# Patient Record
Sex: FEMALE | Race: WHITE | NOT HISPANIC OR LATINO | Employment: OTHER | URBAN - METROPOLITAN AREA
[De-identification: names, ages, dates, MRNs, and addresses within clinical notes are randomized per-mention and may not be internally consistent; named-entity substitution may affect disease eponyms.]

---

## 2018-05-01 ENCOUNTER — NEW PATIENT COMPREHENSIVE (OUTPATIENT)
Dept: URBAN - METROPOLITAN AREA CLINIC 39 | Facility: CLINIC | Age: 68
End: 2018-05-01

## 2018-05-01 DIAGNOSIS — H40.033: ICD-10-CM

## 2018-05-01 DIAGNOSIS — E11.9: ICD-10-CM

## 2018-05-01 PROCEDURE — 3072F LOW RISK FOR RETINOPATHY: CPT

## 2018-05-01 PROCEDURE — 92015 DETERMINE REFRACTIVE STATE: CPT

## 2018-05-01 PROCEDURE — 92020 GONIOSCOPY: CPT

## 2018-05-01 PROCEDURE — 2022F DILAT RTA XM EVC RTNOPTHY: CPT

## 2018-05-01 PROCEDURE — G8427 DOCREV CUR MEDS BY ELIG CLIN: HCPCS

## 2018-05-01 PROCEDURE — G8785 BP SCRN NO PERF AT INTERVAL: HCPCS

## 2018-05-01 PROCEDURE — 1036F TOBACCO NON-USER: CPT

## 2018-05-01 PROCEDURE — 99203 OFFICE O/P NEW LOW 30 MIN: CPT

## 2018-05-01 ASSESSMENT — VISUAL ACUITY
OD_SC: J3
OS_CC: J1+
OD_CC: J1+
OD_SC: 20/30-1
OS_SC: J3
OS_SC: 20/50

## 2018-05-01 ASSESSMENT — TONOMETRY
OS_IOP_MMHG: 12
OD_IOP_MMHG: 18

## 2018-06-25 ENCOUNTER — SURGERY/PROCEDURE (OUTPATIENT)
Dept: URBAN - METROPOLITAN AREA SURGERY 14 | Facility: SURGERY | Age: 68
End: 2018-06-25

## 2018-06-25 ENCOUNTER — CONSULT (OUTPATIENT)
Dept: URBAN - METROPOLITAN AREA SURGERY 14 | Facility: SURGERY | Age: 68
End: 2018-06-25

## 2018-06-25 VITALS
SYSTOLIC BLOOD PRESSURE: 157 MMHG | RESPIRATION RATE: 13 BRPM | HEIGHT: 55 IN | DIASTOLIC BLOOD PRESSURE: 70 MMHG | HEART RATE: 60 BPM

## 2018-06-25 DIAGNOSIS — E11.9: ICD-10-CM

## 2018-06-25 DIAGNOSIS — H40.031: ICD-10-CM

## 2018-06-25 DIAGNOSIS — H40.033: ICD-10-CM

## 2018-06-25 PROCEDURE — 92014 COMPRE OPH EXAM EST PT 1/>: CPT

## 2018-06-25 PROCEDURE — 92132 CPTRZD OPH DX IMG ANT SGM: CPT

## 2018-06-25 PROCEDURE — G8428 CUR MEDS NOT DOCUMENT: HCPCS

## 2018-06-25 PROCEDURE — 92020 GONIOSCOPY: CPT

## 2018-06-25 PROCEDURE — 76514 ECHO EXAM OF EYE THICKNESS: CPT

## 2018-06-25 PROCEDURE — G8952 PRE-HTN/HTN, NO F/U, NOT GVN: HCPCS

## 2018-06-25 PROCEDURE — 66761 REVISION OF IRIS: CPT

## 2018-06-25 PROCEDURE — 1036F TOBACCO NON-USER: CPT

## 2018-06-25 PROCEDURE — G8418 CALC BMI BLW LOW PARAM F/U: HCPCS

## 2018-06-25 RX ORDER — PREDNISOLONE ACETATE 10 MG/ML
1 SUSPENSION/ DROPS OPHTHALMIC
Start: 2018-06-25

## 2018-06-25 ASSESSMENT — VISUAL ACUITY
OS_CC: J1
OD_SC: 20/25-2
OS_SC: 20/50-2
OD_CC: J1

## 2018-06-25 ASSESSMENT — PACHYMETRY
OS_CT_UM: 654
OD_CT_UM: 643

## 2018-06-25 ASSESSMENT — TONOMETRY
OD_IOP_MMHG: 17
OS_IOP_MMHG: 17

## 2018-06-27 ENCOUNTER — SURGERY/PROCEDURE (OUTPATIENT)
Dept: URBAN - METROPOLITAN AREA SURGERY 14 | Facility: SURGERY | Age: 68
End: 2018-06-27

## 2018-06-27 DIAGNOSIS — H40.032: ICD-10-CM

## 2018-06-27 PROCEDURE — 66761 REVISION OF IRIS: CPT

## 2018-07-16 ENCOUNTER — DILATED FUNDUS EXAM (OUTPATIENT)
Dept: URBAN - METROPOLITAN AREA CLINIC 39 | Facility: CLINIC | Age: 68
End: 2018-07-16

## 2018-07-16 DIAGNOSIS — H40.013: ICD-10-CM

## 2018-07-16 DIAGNOSIS — H40.033: ICD-10-CM

## 2018-07-16 PROCEDURE — 9222550 BILAT EXTENDED OPHTHALMOSCOPY, FIRST

## 2018-07-16 PROCEDURE — 92250 FUNDUS PHOTOGRAPHY W/I&R: CPT

## 2018-07-16 PROCEDURE — G8427 DOCREV CUR MEDS BY ELIG CLIN: HCPCS

## 2018-07-16 PROCEDURE — 92132 CPTRZD OPH DX IMG ANT SGM: CPT

## 2018-07-16 PROCEDURE — 92012 INTRM OPH EXAM EST PATIENT: CPT

## 2018-07-16 PROCEDURE — G8785 BP SCRN NO PERF AT INTERVAL: HCPCS

## 2018-07-16 PROCEDURE — 1036F TOBACCO NON-USER: CPT

## 2018-07-16 PROCEDURE — 92020 GONIOSCOPY: CPT

## 2018-07-16 ASSESSMENT — TONOMETRY
OD_IOP_MMHG: 16
OS_IOP_MMHG: 15

## 2018-07-16 ASSESSMENT — VISUAL ACUITY
OD_SC: 20/25-1
OS_SC: 20/40-2
OS_PH: 20/30+1

## 2019-08-21 NOTE — PATIENT DISCUSSION
[facility]: OCT COMPLETED TODAY. MILD FOVEAL THINNING. WILL REPEAT IN 6 MONTHS AND HAVE A DILATED FUNDUS EXAMINATION. 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 ORDER VISUAL FIELD 10-2. FOLLOW-UP AS DIRECTED.

## 2019-08-21 NOTE — PATIENT DISCUSSION
Explained to patient the refraction, and that she may need a pair of glasses.  Patient still denied refraction, and she said her eyes have never been this blurry before. -Clifford Pittman

## 2020-02-25 ENCOUNTER — ESTABLISHED COMPREHENSIVE EXAM (OUTPATIENT)
Dept: URBAN - METROPOLITAN AREA CLINIC 38 | Facility: CLINIC | Age: 70
End: 2020-02-25

## 2020-02-25 DIAGNOSIS — H52.02: ICD-10-CM

## 2020-02-25 DIAGNOSIS — E11.9: ICD-10-CM

## 2020-02-25 DIAGNOSIS — H52.203: ICD-10-CM

## 2020-02-25 DIAGNOSIS — H40.033: ICD-10-CM

## 2020-02-25 DIAGNOSIS — H40.013: ICD-10-CM

## 2020-02-25 DIAGNOSIS — H25.813: ICD-10-CM

## 2020-02-25 DIAGNOSIS — H52.4: ICD-10-CM

## 2020-02-25 PROCEDURE — 92014 COMPRE OPH EXAM EST PT 1/>: CPT

## 2020-02-25 PROCEDURE — 92015 DETERMINE REFRACTIVE STATE: CPT

## 2020-02-25 ASSESSMENT — TONOMETRY
OS_IOP_MMHG: 18
OS_IOP_MMHG: 20
OD_IOP_MMHG: 22
OD_IOP_MMHG: 20

## 2020-02-25 ASSESSMENT — VISUAL ACUITY
OD_CC: 20/20-2
OS_CC: 20/40-2
OU_SC: J3-
OD_SC: J4
OS_SC: J4
OU_CC: 20/30

## 2020-08-25 ENCOUNTER — IOP CHECK (OUTPATIENT)
Dept: URBAN - METROPOLITAN AREA CLINIC 38 | Facility: CLINIC | Age: 70
End: 2020-08-25

## 2020-08-25 DIAGNOSIS — H40.013: ICD-10-CM

## 2020-08-25 PROCEDURE — 92133 CPTRZD OPH DX IMG PST SGM ON: CPT

## 2020-08-25 PROCEDURE — 99213 OFFICE O/P EST LOW 20 MIN: CPT

## 2020-08-25 ASSESSMENT — VISUAL ACUITY
OU_SC: 20/20
OS_PH: 20/30+2
OD_SC: 20/20-2

## 2020-08-25 ASSESSMENT — TONOMETRY
OD_IOP_MMHG: 20
OS_IOP_MMHG: 19

## 2021-03-16 NOTE — PATIENT DISCUSSION
The patient has mild cornea guttata, or early Fuch's endothelial dystrophy. Specular microscopy documented the cell count and morphology. The possibility that the condition could progress was explained. At this early stage observation and serial specular microscopy are indicated. Specs.

## 2022-03-07 NOTE — PATIENT DISCUSSION
One drop to both eyes 1-4 times daily. We recommend Systane or Refresh lubricating eye drops which can be found at any pharmacy.

## 2022-07-26 ENCOUNTER — COMPREHENSIVE EXAM (OUTPATIENT)
Dept: URBAN - METROPOLITAN AREA CLINIC 38 | Facility: CLINIC | Age: 72
End: 2022-07-26

## 2022-07-26 DIAGNOSIS — H52.4: ICD-10-CM

## 2022-07-26 DIAGNOSIS — H52.03: ICD-10-CM

## 2022-07-26 DIAGNOSIS — H52.203: ICD-10-CM

## 2022-07-26 DIAGNOSIS — H02.834: ICD-10-CM

## 2022-07-26 DIAGNOSIS — E11.9: ICD-10-CM

## 2022-07-26 DIAGNOSIS — H40.033: ICD-10-CM

## 2022-07-26 DIAGNOSIS — H02.831: ICD-10-CM

## 2022-07-26 DIAGNOSIS — H25.813: ICD-10-CM

## 2022-07-26 DIAGNOSIS — H40.013: ICD-10-CM

## 2022-07-26 PROCEDURE — 92133 CPTRZD OPH DX IMG PST SGM ON: CPT

## 2022-07-26 PROCEDURE — 92015 DETERMINE REFRACTIVE STATE: CPT

## 2022-07-26 PROCEDURE — 92014 COMPRE OPH EXAM EST PT 1/>: CPT

## 2022-07-26 ASSESSMENT — TONOMETRY
OD_IOP_MMHG: 20
OS_IOP_MMHG: 20

## 2022-07-26 ASSESSMENT — VISUAL ACUITY
OD_SC: J10
OS_SC: J10
OS_SC: 20/40
OS_PH: 20/25
OD_SC: 20/25+2

## 2022-07-27 ENCOUNTER — CONSULTATION/EVALUATION (OUTPATIENT)
Dept: URBAN - METROPOLITAN AREA CLINIC 42 | Facility: CLINIC | Age: 72
End: 2022-07-27

## 2022-07-27 DIAGNOSIS — H40.033: ICD-10-CM

## 2022-07-27 DIAGNOSIS — H04.123: ICD-10-CM

## 2022-07-27 DIAGNOSIS — E11.9: ICD-10-CM

## 2022-07-27 DIAGNOSIS — H40.013: ICD-10-CM

## 2022-07-27 DIAGNOSIS — H25.813: ICD-10-CM

## 2022-07-27 PROCEDURE — 92020 GONIOSCOPY: CPT

## 2022-07-27 PROCEDURE — V2799PMN IMPRIMIS PRED-MOXI-NEPAF 5ML

## 2022-07-27 PROCEDURE — 92136TC INTERFEROMETRY - TECHNICAL COMPONENT

## 2022-07-27 PROCEDURE — 92014 COMPRE OPH EXAM EST PT 1/>: CPT

## 2022-07-27 ASSESSMENT — TONOMETRY
OD_IOP_MMHG: 20
OS_IOP_MMHG: 20

## 2022-07-27 ASSESSMENT — VISUAL ACUITY
OS_SC: J6
OD_BAT: 20/80
OS_SC: 20/40-1
OS_BAT: 20/100
OD_SC: J5
OD_SC: 20/25-1

## 2022-09-14 ENCOUNTER — PRE-OP/H&P (OUTPATIENT)
Dept: URBAN - METROPOLITAN AREA SURGERY 14 | Facility: SURGERY | Age: 72
End: 2022-09-14

## 2022-09-14 ENCOUNTER — SURGERY/PROCEDURE (OUTPATIENT)
Dept: URBAN - METROPOLITAN AREA CLINIC 39 | Facility: CLINIC | Age: 72
End: 2022-09-14

## 2022-09-14 DIAGNOSIS — H40.013: ICD-10-CM

## 2022-09-14 DIAGNOSIS — E11.9: ICD-10-CM

## 2022-09-14 DIAGNOSIS — H25.813: ICD-10-CM

## 2022-09-14 PROCEDURE — 99211T TECH SERVICE

## 2022-09-14 PROCEDURE — 66984 XCAPSL CTRC RMVL W/O ECP: CPT

## 2022-09-15 ENCOUNTER — POST-OP (OUTPATIENT)
Dept: URBAN - METROPOLITAN AREA CLINIC 38 | Facility: CLINIC | Age: 72
End: 2022-09-15

## 2022-09-15 DIAGNOSIS — Z96.1: ICD-10-CM

## 2022-09-15 PROCEDURE — 99024 POSTOP FOLLOW-UP VISIT: CPT

## 2022-09-15 ASSESSMENT — VISUAL ACUITY
OU_SC: J4-
OS_CC: J1
OD_SC: 20/20-1
OU_CC: J1
OU_SC: 20/20-2
OS_SC: J3-
OD_SC: J3
OS_SC: 20/25-1
OD_CC: J1

## 2022-09-15 ASSESSMENT — KERATOMETRY
OD_AXISANGLE_DEGREES: 160
OD_AXISANGLE2_DEGREES: 70
OD_K2POWER_DIOPTERS: 43.50
OD_K1POWER_DIOPTERS: 42.75
OS_K1POWER_DIOPTERS: 41.75
OS_AXISANGLE2_DEGREES: 95
OS_K2POWER_DIOPTERS: 43.00
OS_AXISANGLE_DEGREES: 5

## 2022-09-15 ASSESSMENT — TONOMETRY
OS_IOP_MMHG: 20
OD_IOP_MMHG: 20

## 2022-09-20 ENCOUNTER — POST OP/EVAL OF SECOND EYE (OUTPATIENT)
Dept: URBAN - METROPOLITAN AREA CLINIC 38 | Facility: CLINIC | Age: 72
End: 2022-09-20

## 2022-09-20 DIAGNOSIS — H25.812: ICD-10-CM

## 2022-09-20 DIAGNOSIS — Z96.1: ICD-10-CM

## 2022-09-20 DIAGNOSIS — H25.811: ICD-10-CM

## 2022-09-20 PROCEDURE — 92012 INTRM OPH EXAM EST PATIENT: CPT

## 2022-09-20 PROCEDURE — 99024 POSTOP FOLLOW-UP VISIT: CPT

## 2022-09-20 ASSESSMENT — KERATOMETRY
OS_AXISANGLE2_DEGREES: 95
OS_K2POWER_DIOPTERS: 43.00
OD_AXISANGLE_DEGREES: 160
OS_K1POWER_DIOPTERS: 41.75
OS_AXISANGLE_DEGREES: 5
OD_K1POWER_DIOPTERS: 42.75
OD_K2POWER_DIOPTERS: 43.50
OD_AXISANGLE2_DEGREES: 70

## 2022-09-20 ASSESSMENT — VISUAL ACUITY
OS_SC: 20/25+2
OD_SC: 20/25+2
OD_SC: J3
OS_SC: J3

## 2022-09-20 ASSESSMENT — TONOMETRY
OS_IOP_MMHG: 20
OD_IOP_MMHG: 20

## 2022-09-21 ENCOUNTER — SURGERY/PROCEDURE (OUTPATIENT)
Dept: URBAN - METROPOLITAN AREA CLINIC 39 | Facility: CLINIC | Age: 72
End: 2022-09-21

## 2022-09-21 DIAGNOSIS — H25.811: ICD-10-CM

## 2022-09-21 PROCEDURE — 66984 XCAPSL CTRC RMVL W/O ECP: CPT

## 2022-09-21 ASSESSMENT — KERATOMETRY
OS_K2POWER_DIOPTERS: 43.00
OS_AXISANGLE2_DEGREES: 95
OS_AXISANGLE_DEGREES: 5
OD_K2POWER_DIOPTERS: 43.50
OD_AXISANGLE2_DEGREES: 70
OD_AXISANGLE_DEGREES: 160
OS_K1POWER_DIOPTERS: 41.75
OD_K1POWER_DIOPTERS: 42.75

## 2022-09-22 ENCOUNTER — POST-OP (OUTPATIENT)
Dept: URBAN - METROPOLITAN AREA CLINIC 38 | Facility: CLINIC | Age: 72
End: 2022-09-22

## 2022-09-22 DIAGNOSIS — Z96.1: ICD-10-CM

## 2022-09-22 PROCEDURE — 99024 POSTOP FOLLOW-UP VISIT: CPT

## 2022-09-22 ASSESSMENT — VISUAL ACUITY
OU_SC: 20/20
OU_SC: J3
OD_SC: 20/25-1
OD_SC: J10
OS_SC: J3
OS_SC: 20/30-1

## 2022-09-22 ASSESSMENT — TONOMETRY
OD_IOP_MMHG: 20
OS_IOP_MMHG: 20

## 2022-09-22 ASSESSMENT — KERATOMETRY
OS_AXISANGLE2_DEGREES: 95
OD_AXISANGLE2_DEGREES: 70
OD_K1POWER_DIOPTERS: 42.75
OS_K1POWER_DIOPTERS: 41.75
OD_AXISANGLE_DEGREES: 160
OD_K2POWER_DIOPTERS: 43.50
OS_K2POWER_DIOPTERS: 43.00
OS_AXISANGLE_DEGREES: 5

## 2022-10-07 NOTE — PATIENT DISCUSSION
Discussed condition and exacerbating conditions/situations (e.g., dry/arid environments, overhead fans, air conditioners, side effect of medications). Discussed the use of warm compresses.

## 2022-10-13 ASSESSMENT — KERATOMETRY
OS_AXISANGLE_DEGREES: 5
OS_K2POWER_DIOPTERS: 43.00
OD_AXISANGLE2_DEGREES: 70
OS_K1POWER_DIOPTERS: 41.75
OD_K2POWER_DIOPTERS: 43.50
OD_AXISANGLE_DEGREES: 160
OD_K1POWER_DIOPTERS: 42.75
OS_AXISANGLE2_DEGREES: 95

## 2022-10-14 ENCOUNTER — POST-OP (OUTPATIENT)
Dept: URBAN - METROPOLITAN AREA CLINIC 38 | Facility: CLINIC | Age: 72
End: 2022-10-14

## 2022-10-14 DIAGNOSIS — Z96.1: ICD-10-CM

## 2022-10-14 PROCEDURE — 99024 POSTOP FOLLOW-UP VISIT: CPT

## 2022-10-14 ASSESSMENT — VISUAL ACUITY
OS_SC: J2
OD_SC: J2
OU_SC: 20/20-1
OD_SC: 20/20
OS_SC: 20/25-1
OU_SC: J2

## 2022-10-14 ASSESSMENT — TONOMETRY
OS_IOP_MMHG: 20
OD_IOP_MMHG: 19